# Patient Record
(demographics unavailable — no encounter records)

---

## 2024-10-14 NOTE — DISCUSSION/SUMMARY
[FreeTextEntry1] : 66M w HFrEF 25%, NICM, HLD, hx of GIB presents for f/u  1. NICM -HFrEF 25% -euvolemic, no complaints -taking meds with good compliance  -tte with LVEF <35  -f/u 4-5 months unless requires sooner 40 min spent on complete encounter.    [EKG obtained to assist in diagnosis and management of assessed problem(s)] : EKG obtained to assist in diagnosis and management of assessed problem(s)

## 2024-10-14 NOTE — HISTORY OF PRESENT ILLNESS
[FreeTextEntry1] : 66M w HFrEF 25%, NICM, HLD, hx of GIB presents for f/u. PMD: Dr Cardozo prev cardiologist Dr Ricci in [place], no longer taking his insurance.  Previously, pt last seen in cardiology for an initial eval 12/21. pt with known cardiomyopathy with LVEF 20%. prev nuclear stress without perfusion defects, pt has never had a cardiac cath. pt states a single episode 30 years ago where he had a pna and an arrhythmia and requires a "shock", states no further episodes since. pt maintained on bb, entresto, farxiga. pt to bring in records of prior cardiac imaging including cMRI. 5/2022, feeling well, states he takes entresto once daily and only half a pill of toprol. med compliance urged. pt last seen 9/22, feeling well. on entresto, toprol, farxiga pt has not been seen since 9/22 11/23, feeling well. plan for tte to eval LV EF, consider icd eval. pt also planning a colonoscopy. 1/24, feeling well. repeat tte showing LV EF 30-35% last seen 6/24, feeling ok. states poor med compliance if at all. planning sinus sx. pt not interested in taking any meds. sent to EP, recommended an ICD.  pt admitted to [facility] for ADHF 7/24. LV EF 12%. LHC with luminal irregularities, RHC showing CI 1.9. pt also sent to HF and cardio-genetics  pt now presents for follow up. today,  pt feeling well. no complaints. denies cp or sob, at rest or on exertion. denies LE edema, orthopnea. denies syncope. pt denies orthopnea.  now states improved med compliance  Exercise: every morning, push ups/squats. without symptoms Diet: none, eats very little meat. mostly fish.  Prior cardiac workup: per chart: TTE 3/2020: LVEF 20% Nuc stress 2020: normal perfusion Recent labs:  Med hx: HFrEF, colitis. Sx hx: none Fam hx: cardiomyopathy Social hx: lives in [place] with wife. 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 "[employer]". no tob. no etoh. +marijuana weekly. Meds: entresto  daily, toprol 25 farxiga 10, mesalamine aldactone 25 lipitor Allergies: nkda

## 2024-10-14 NOTE — HISTORY OF PRESENT ILLNESS
[FreeTextEntry1] : 66M w HFrEF 25%, NICM, HLD, hx of GIB presents for f/u. PMD: Dr Cardozo prev cardiologist Dr Ricci in [place], no longer taking his insurance.  Previously, pt last seen in cardiology for an initial eval 12/21. pt with known cardiomyopathy with LVEF 20%. prev nuclear stress without perfusion defects, pt has never had a cardiac cath. pt states a single episode 30 years ago where he had a pna and an arrhythmia and requires a "shock", states no further episodes since. pt maintained on bb, entresto, farxiga. pt to bring in records of prior cardiac imaging including cMRI. 5/2022, feeling well, states he takes entresto once daily and only half a pill of toprol. med compliance urged. pt last seen 9/22, feeling well. on entresto, toprol, farxiga pt has not been seen since 9/22 11/23, feeling well. plan for tte to eval LV EF, consider icd eval. pt also planning a colonoscopy. 1/24, feeling well. repeat tte showing LV EF 30-35% last seen 6/24, feeling ok. states poor med compliance if at all. planning sinus sx. pt not interested in taking any meds. sent to EP, recommended an ICD.  pt admitted to [facility] for ADHF 7/24. LV EF 12%. LHC with luminal irregularities, RHC showing CI 1.9. pt also sent to HF and cardio-genetics  pt now presents for follow up. today,  pt feeling well. no complaints. denies cp or sob, at rest or on exertion. denies LE edema, orthopnea. denies syncope. pt denies orthopnea.  now states improved med compliance  Exercise: every morning, push ups/squats. without symptoms Diet: none, eats very little meat. mostly fish.  Prior cardiac workup: per chart: TTE 3/2020: LVEF 20% Nuc stress 2020: normal perfusion Recent labs:  Med hx: HFrEF, colitis. Sx hx: none Fam hx: cardiomyopathy Social hx: lives in [place] with wife. 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 no tob. no etoh. +marijuana weekly. Meds: entresto  daily, toprol 25 farxiga 10, mesalamine aldactone 25 lipitor Allergies: nkda

## 2024-11-14 NOTE — HISTORY OF PRESENT ILLNESS
[de-identified] : 66M presenting with CRS and vc polyp for many years. He was recommended surgery. However, given his cardiac comorbidities, he is not a good candidate. He reports nasal congestion, facial pain/pressure, thick pus/mucus and cough worse in the AM.  CT in [facility] reviewed. Shows chronic pansinusitis with complete opacificatoin and inspissated secretions of the R max.

## 2024-11-14 NOTE — PLAN
[TextEntry] : We extensively reviewed his imaging and symptoms. Ultimately, he would benefit from a FESS but given his comorbidities, it is most appropriate to try and achieve symptomatic improvement with more limited in office procedure under local.   I will refer to Dr. Cabrales for his vocal cord polyp.   We will schedule for in office procedure when we have approval.

## 2024-11-14 NOTE — PROCEDURE
[FreeTextEntry6] : Nasal Endoscopy: Procedure: Bilateral nasal endoscopy (CPT 10684)   Indication: Anterior rhinoscopy was inadequate to evaluate pathology.  Left: Septum midline Moderate inferior turbinate hypertrophy  Middle meatus clear, without masses, polyps, or pus Sphenoethmoidal recess clear, without masses, polyps, or pus  Right:  medialized uncinate polypoid change in MM of ethmoid bulla rolled uncinate abutting MT and septum with edema  [de-identified] : Laryngoscopy: NPL: Nasopharynx clear without masses Base of tongue without masses or lesions Vallecula clear Pyriforms clear Epiglottis crisp TVFs mobile bilaterally, R TVF polyp Arytenoids normal Glottic airway patent

## 2024-12-03 NOTE — HISTORY OF PRESENT ILLNESS
[de-identified] : ALYSSA SEGURA is a 66 year old man who presents to the [facility] with difficulty phonating and vocal cord polyp. He is referred by Dr. Param Jacobo This problem has been going on for 30 years, but is now interested in surgery. They describe their voice as hoarse and raspy. Voice occasionally cuts out completely. No pain when speaking. He now does note periods of normal voicing. He does not note specific difficulties with swallowing. He does not note frequent classic heartburn symptoms. 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 Hx of chronic sinusitis and CHF with low ejection fraction, previously failed PST 4-5 months ago.  Has dried blood he spits up in the morning 1-2 times a week and occasionally wipes blood from his nose.  Denies dysphagia, odynophagia, dyspnea, fevers/chills, and unintentional weight loss.

## 2024-12-03 NOTE — CONSULT LETTER
[Dear  ___] : Dear  [unfilled], [Consult Letter:] : I had the pleasure of evaluating your patient, [unfilled]. [Please see my note below.] : Please see my note below. [Consult Closing:] : Thank you very much for allowing me to participate in the care of this patient.  If you have any questions, please do not hesitate to contact me. [Sincerely,] : Sincerely, [FreeTextEntry2] : Dr. Param Jacobo [FreeTextEntry3] : Teo Cabrales M.D. Division of Laryngology | Department of Otolaryngology  [address] [address]

## 2024-12-03 NOTE — PROCEDURE
[de-identified] : Stroboscopic Laryngoscopy Procedure Note: Indications: Assess laryngeal biomechanics and vocal fold oscillation. Description of Procedure: Informed consent was verbally obtained from the patient prior to the procedure. The patient was seated in the clinic chair. Topical anesthesia was achieved by first spraying the nasal cavities with 4% lidocaine and nasal decongestant. Findings: Supraglottis: no masses or lesions Glottis:  Vocal cords:                       Right: very large cyst involving majority of vocal fold                       Left:  crisp and shows no lesions or masses                Mobility:                       Right:  normal                       Left:  normal                Amplitude:                       Right:  absent                       Left:  normal                Closure: incomplete                Wave symmetry:  asymmetric Subglottis: no masses or lesions within the visualized subglottis. Visualized airway is widely patent. Other:

## 2024-12-03 NOTE — CONSULT LETTER
[Dear  ___] : Dear  [unfilled], [Consult Letter:] : I had the pleasure of evaluating your patient, [unfilled]. [Please see my note below.] : Please see my note below. [Consult Closing:] : Thank you very much for allowing me to participate in the care of this patient.  If you have any questions, please do not hesitate to contact me. [Sincerely,] : Sincerely, [FreeTextEntry2] : Dr. Param Jacobo [FreeTextEntry3] : Teo Cabrales M.D. Division of Laryngology | Department of Otolaryngology  [address] [address]

## 2024-12-03 NOTE — PROCEDURE
[de-identified] : Stroboscopic Laryngoscopy Procedure Note: Indications: Assess laryngeal biomechanics and vocal fold oscillation. Description of Procedure: Informed consent was verbally obtained from the patient prior to the procedure. The patient was seated in the clinic chair. Topical anesthesia was achieved by first spraying the nasal cavities with 4% lidocaine and nasal decongestant. Findings: Supraglottis: no masses or lesions Glottis:  Vocal cords:                       Right: very large cyst involving majority of vocal fold                       Left:  crisp and shows no lesions or masses                Mobility:                       Right:  normal                       Left:  normal                Amplitude:                       Right:  absent                       Left:  normal                Closure: incomplete                Wave symmetry:  asymmetric Subglottis: no masses or lesions within the visualized subglottis. Visualized airway is widely patent. Other:

## 2024-12-03 NOTE — ASSESSMENT
[FreeTextEntry1] :  Assessment/Plan: #1 CHF #2 Right vocal cord cyst #3 Dysphonia  After a thorough discussion of our findings I gave the patient the following options: 1) Observation 2) Voice therapy alone 3) Voice therapy plus surgery.  Surgery would involve direct laryngoscopy with excision of vocal fold lesion(s) with microflap, possible laryngeal biopsy, injection of steroid in right and/or left vocal fold(s), and examination of their trachea and mainstem bronchi.  The risks include but are not limited to damage to the teeth, vocal fold scarring, and a worse voice.  They would see a speech language pathologist prior to surgery and at least once after surgery at approximately the 1 month post-op narinder.  They would see me post operatively at 1 week and 1 month.  They would be expected to have strict voice rest 5 days after surgery and moderate voice use for the 2 weeks following that.  Had long discussion regarding in OR as gold standard but as patient has CHF that has previously prevented him from going to OR he would like to instead trial in office laser treatment first.  Understands that there is far from a gaurantee that this would permanetly remove this cyst and there would be a large chance of recurrence but as his cyst is so large it would hopefully be much smaller.  Plan would be to pop/drain all aspects of this cyst and had a small hole inferiorly for it to drain as it heals.  No need for voice rest. I would want to see him back in 2 weeks after in office procedure. The risks, benefits, and alternatives to care were discussed with the patient and understanding expressed.

## 2024-12-03 NOTE — HISTORY OF PRESENT ILLNESS
[de-identified] : ALYSSA SEGURA is a 66 year old man who presents to the [facility] with difficulty phonating and vocal cord polyp. He is referred by Dr. Param Jacobo This problem has been going on for 30 years, but is now interested in surgery. They describe their voice as hoarse and raspy. Voice occasionally cuts out completely. No pain when speaking. He now does note periods of normal voicing. He does not note specific difficulties with swallowing. He does not note frequent classic heartburn symptoms. 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 Hx of chronic sinusitis and CHF with low ejection fraction, previously failed PST 4-5 months ago.  Has dried blood he spits up in the morning 1-2 times a week and occasionally wipes blood from his nose.  Denies dysphagia, odynophagia, dyspnea, fevers/chills, and unintentional weight loss.

## 2024-12-18 NOTE — HISTORY OF PRESENT ILLNESS
[FreeTextEntry1] : 66 M w/ hx of NICM/HFrEF (EF 30-35% in 1/2024), HTN, HLD, GIB, ulcerative colitis (last flare 2023), chronic sinusitis, voice hoarseness 2/2 vocal cord polyp, L rotator cuff tears x2 presenting today for follow up.   He was admitted to [facility] from 7/12-7/18/24. He presented complaining of 2 weeks of worsening SOB/PACK associated with chest pain, orthopnea and PND found to be volume overloaded and noted to have new TWIs on ekg. Of note, he had been noncompliant with his cardiac medications since approximately October 2023 after reported improvement of his EF to 35% and fear of kidney disease given family history. TTE revealed severely reduced LVEF of 12% with reduced RV function and moderate MR. He was found to be overloaded, treated with intermittent IV Lasix. Underwent R/LHC 7/15 revealing mild irregularites and notably, hemodynamics with low Lindsay CI of 1.9 in the setting of elevated SVR of 2,275 dsc .He was initiated on some GDMT without diuretic requirement on discharge. Labs on discharge Cr 1.05/K 4.2, BNP 2614 (7/12).   He reports feeling generally well since discharge however has been complaining of intermittent lightheadedness at rest which typically occurs following his medications. He reports taking his AM medications all together. Reports slight improvement of symptom over the last few days. He has been closely monitoring his blood pressures at variable times throughout the day with values ranging 70s-90s/50s-60s. He confirms he is using upper arm cuff.  AT: He has been keeping active post-discharge, walking on the treadmill for approx 20-30 min without dyspnea/chest pain. Daily weights in range of 125-127lbs. His reported fluid intake- about 1L. Adhering to No salt diet.   He denies CP, SOB at rest, LE edema, orthopnea, PND, abdominal discomfort, palpitations, syncope. His appetite is normal, reports urinating slightly more often than prior to hospitalization. He denies recent diarrhea/vomiting or UC flare.    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 He has one daughter who lives in [place] and visits frequently. He has stopped smoking marijuana since hospitalization. He denies alcohol or other substance use. had previously smoked cigarettes on a social basis last time 2 years ago.

## 2024-12-18 NOTE — PHYSICAL EXAM
[No Acute Distress] : no acute distress [Normal Conjunctiva] : normal conjunctiva [Normal S1, S2] : normal S1, S2 [No Murmur] : no murmur [Clear Lung Fields] : clear lung fields [Good Air Entry] : good air entry [Soft] : abdomen soft [Normal Gait] : normal gait [No Edema] : no edema [No Rash] : no rash [Moves all extremities] : moves all extremities [Alert and Oriented] : alert and oriented [de-identified] : JVP <6cm [de-identified] : warm peripherally

## 2024-12-18 NOTE — DISCUSSION/SUMMARY
[FreeTextEntry1] : First seen in July 2024. had an NP visit for up titration scheduled but patient wasnt aware.  65yrs,  Long standing non ischemic CMP. Following with  Dr Juares. HFrEF LVEF 35% Oct 2023 FH: father with CMP.  HTN, UC on mesalamine.  vocal cord polyp.  prior daily marujuana use and meds non compliance.  previously  refused referral for ICD eval.  Admitted July 12-18: ADHF. CP SOB orthopena. TW inversion.  RHC RA 4, PA 38/19 27, PCWP 22, PA sat 65 Lindsay 3.2/1.9 Non obstructive cors. 125lbs TTE 7.16: LVEF 12, global. LVEDD 6.4, RV normal size with reduced function. mild mod MR. trace TR. trace effusion.  d/c weight 125lbs  seen by Dr Leung post d/c - patient wants to defer decision re ICD "my heart will get better". Has been referred for genetic testing did not follow up with Dr Simpson "was on vaccation- called to cancel but did not reschedule".  current  meds: mod entresto, toprol 25, farxega 5, atova 40, ald 25 mesalamine. no diuretics.  Feeling well. Can run up the stairs. No LE edema. BP at home 111s 131/72!!  67  140lbs (130 d/c 125) "this my natural weight" euvolemic no recent labs.  Will reschedule patient for uptitration.  Entresto to full dose in 2 steps. Then toprol 50. Q 3 weeks NP visits/telehealth  See me 4 mth after TTE Will renegotiate ICD after that.  Will reschedule Dr Calvin Jewell  45 mins with patient and chart

## 2024-12-18 NOTE — CARDIOLOGY SUMMARY
[de-identified] : 7/16/24 TTE: LVIDd 6.4 cm, LVEF 12% (global), normal RV size with reduced function (no TAPSE), mod LAE, MARTHA, trace AI, mild-mod MR, trace TR, trace pericardial effusion, IVC dilated 2.6 cm with normal inspiratory collapse   2021 TTE: LVIDd 5.7 cm, LVEF 16%   [de-identified] : 2021 cMRI: LVEF 28%, no LGE  [de-identified] : 7/15/24 L/RHC nonobstructive CAD, RA 4, PA 38/19/27, PCWP 22, PA 64.7%, CO/CI (F) 3.2/1.9, /83 (95), HR 96, SVR 2,275 dsc

## 2024-12-18 NOTE — CARDIOLOGY SUMMARY
[de-identified] : 7/16/24 TTE: LVIDd 6.4 cm, LVEF 12% (global), normal RV size with reduced function (no TAPSE), mod LAE, MARTHA, trace AI, mild-mod MR, trace TR, trace pericardial effusion, IVC dilated 2.6 cm with normal inspiratory collapse   2021 TTE: LVIDd 5.7 cm, LVEF 16%   [de-identified] : 2021 cMRI: LVEF 28%, no LGE  [de-identified] : 7/15/24 L/RHC nonobstructive CAD, RA 4, PA 38/19/27, PCWP 22, PA 64.7%, CO/CI (F) 3.2/1.9, /83 (95), HR 96, SVR 2,275 dsc

## 2024-12-18 NOTE — PHYSICAL EXAM
[No Acute Distress] : no acute distress [Normal Conjunctiva] : normal conjunctiva [Normal S1, S2] : normal S1, S2 [No Murmur] : no murmur [Clear Lung Fields] : clear lung fields [Good Air Entry] : good air entry [Soft] : abdomen soft [Normal Gait] : normal gait [No Edema] : no edema [No Rash] : no rash [Moves all extremities] : moves all extremities [Alert and Oriented] : alert and oriented [de-identified] : JVP <6cm [de-identified] : warm peripherally

## 2024-12-18 NOTE — HISTORY OF PRESENT ILLNESS
[FreeTextEntry1] : 66 M w/ hx of NICM/HFrEF (EF 30-35% in 1/2024), HTN, HLD, GIB, ulcerative colitis (last flare 2023), chronic sinusitis, voice hoarseness 2/2 vocal cord polyp, L rotator cuff tears x2 presenting today for follow up.   He was admitted to [facility] from 7/12-7/18/24. He presented complaining of 2 weeks of worsening SOB/PACK associated with chest pain, orthopnea and PND found to be volume overloaded and noted to have new TWIs on ekg. Of note, he had been noncompliant with his cardiac medications since approximately October 2023 after reported improvement of his EF to 35% and fear of kidney disease given family history. TTE revealed severely reduced LVEF of 12% with reduced RV function and moderate MR. He was found to be overloaded, treated with intermittent IV Lasix. Underwent R/LHC 7/15 revealing mild irregularites and notably, hemodynamics with low Lindsay CI of 1.9 in the setting of elevated SVR of 2,275 dsc .He was initiated on some GDMT without diuretic requirement on discharge. Labs on discharge Cr 1.05/K 4.2, BNP 2614 (7/12).   He reports feeling generally well since discharge however has been complaining of intermittent lightheadedness at rest which typically occurs following his medications. He reports taking his AM medications all together. Reports slight improvement of symptom over the last few days. He has been closely monitoring his blood pressures at variable times throughout the day with values ranging 70s-90s/50s-60s. He confirms he is using upper arm cuff.  AT: He has been keeping active post-discharge, walking on the treadmill for approx 20-30 min without dyspnea/chest pain. Daily weights in range of 125-127lbs. His reported fluid intake- about 1L. Adhering to No salt diet.   He denies CP, SOB at rest, LE edema, orthopnea, PND, abdominal discomfort, palpitations, syncope. His appetite is normal, reports urinating slightly more often than prior to hospitalization. He denies recent diarrhea/vomiting or UC flare.    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 He has stopped smoking marijuana since hospitalization. He denies alcohol or other substance use. had previously smoked cigarettes on a social basis last time 2 years ago.

## 2024-12-19 NOTE — HISTORY OF PRESENT ILLNESS
[de-identified] : Update 12/19/24: fu CRS s/p in office R limited FESS with frontal balloon for fungal ball 12/12/24.  States he has not completed course of antibiotics. He is doing well overall, breathing and congestion has improved. Denies epistaxis, fevers, chills, sinus pressure/pain, and recent infections. Pending apt with Dr. Cabrales 12/26 for dysphonia.   Update 12/12/24: fu CRS. plan for limited in office FESS today  66M presenting with CRS and vc polyp for many years. He was recommended surgery. However, given his cardiac comorbidities, he is not a good candidate. He reports nasal congestion, facial pain/pressure, thick pus/mucus and cough worse in the AM.  CT in [facility] reviewed. Shows chronic pansinusitis with complete opacificatoin and inspissated secretions of the R max.

## 2024-12-19 NOTE — ASSESSMENT
[FreeTextEntry1] : 66M presenting with CRS and R VC polyp s/p in office R limited FESS and frontal balloon 12/12/24.

## 2024-12-19 NOTE — PROCEDURE
[FreeTextEntry3] : Informed consent was obtained. The patient was registered to stereotactic CT image guidance via surface match registratoin. The right nasal cavity was decongested with topical afrin. It was then anesthetized with 4% topical lidocaine. Additionally, 2cc of 6% tetracaine jelly topically was used. After adequate time, the microdebrider and backbiter were used to remove the uncinate. Copious fungus was noted in the maxillary sinus with an expanded medial wall. This was removed with angled instrumentation and the angled endoscope. That natural os was widened circumferentially using through cutting instruments. Then attention was turned towards the anterior ethmoid cavity. The anterior ethmoid cells had polypoid change noted. The cells were removed with the microdebrider. Then attention was turned towards the right frontal sinus. The navigated frontal sinus balloon was then advanced into the frontal sinus and inflated for 3 seconds. The inferior turbinate was outfractured with a freer elevator. Hemostasis was achieved with topical afrin soaked cotton. Posisep was placed in the middle meatus. Pt tolerated the procedure well.  [Post-Op Patency] : post-op patency [Anterior rhinoscopy insufficient to account for symptoms] : anterior rhinoscopy insufficient to account for symptoms [de-identified] : alexandra [FreeTextEntry6] : Nasal Endoscopy: Procedure: Bilateral nasal endoscopy (CPT 35322)   Indication: Anterior rhinoscopy was inadequate to evaluate pathology.  Left: Septum midline Moderate inferior turbinate hypertrophy  Middle meatus clear, without masses, polyps, or pus Sphenoethmoidal recess clear, without masses, polyps, or pus  Right:  medialized uncinate polypoid change in MM of ethmoid bulla rolled uncinate abutting MT and septum with edema

## 2024-12-19 NOTE — PROCEDURE
[FreeTextEntry3] : Informed consent was obtained. The patient was registered to stereotactic CT image guidance via surface match registratoin. The right nasal cavity was decongested with topical afrin. It was then anesthetized with 4% topical lidocaine. Additionally, 2cc of 6% tetracaine jelly topically was used. After adequate time, the microdebrider and backbiter were used to remove the uncinate. Copious fungus was noted in the maxillary sinus with an expanded medial wall. This was removed with angled instrumentation and the angled endoscope. That natural os was widened circumferentially using through cutting instruments. Then attention was turned towards the anterior ethmoid cavity. The anterior ethmoid cells had polypoid change noted. The cells were removed with the microdebrider. Then attention was turned towards the right frontal sinus. The navigated frontal sinus balloon was then advanced into the frontal sinus and inflated for 3 seconds. The inferior turbinate was outfractured with a freer elevator. Hemostasis was achieved with topical afrin soaked cotton. Posisep was placed in the middle meatus. Pt tolerated the procedure well.  [Post-Op Patency] : post-op patency [Anterior rhinoscopy insufficient to account for symptoms] : anterior rhinoscopy insufficient to account for symptoms [de-identified] : alexandra [FreeTextEntry6] : Nasal Endoscopy: Procedure: Bilateral nasal endoscopy (CPT 06476)   Indication: Anterior rhinoscopy was inadequate to evaluate pathology.  Left: Septum midline Moderate inferior turbinate hypertrophy  Middle meatus clear, without masses, polyps, or pus Sphenoethmoidal recess clear, without masses, polyps, or pus  Right:  medialized uncinate polypoid change in MM of ethmoid bulla rolled uncinate abutting MT and septum with edema

## 2024-12-19 NOTE — HISTORY OF PRESENT ILLNESS
[de-identified] : Update 12/19/24: fu CRS s/p in office R limited FESS with frontal balloon for fungal ball 12/12/24.  States he has not completed course of antibiotics. He is doing well overall, breathing and congestion has improved. Denies epistaxis, fevers, chills, sinus pressure/pain, and recent infections. Pending apt with Dr. Cabrales 12/26 for dysphonia.   Update 12/12/24: fu CRS. plan for limited in office FESS today  66M presenting with CRS and vc polyp for many years. He was recommended surgery. However, given his cardiac comorbidities, he is not a good candidate. He reports nasal congestion, facial pain/pressure, thick pus/mucus and cough worse in the AM.  CT in [facility] reviewed. Shows chronic pansinusitis with complete opacificatoin and inspissated secretions of the R max.

## 2024-12-26 NOTE — HISTORY OF PRESENT ILLNESS
[de-identified] :  ALYSSA SEGURA is a 66 year old man who presents to the [facility] with CHF, right vocal cord cyst, and dysphonia. Last seen 12/3/24. As pt has previously had problems going to the OR due to CHF, we were to trial in-office laser to pop and drain the cyst.   Previously reported: difficulty phonating and vocal cord polyp. He is referred by Dr. Param Jacobo This problem has been going on for 30 years, but is now interested in surgery. They describe their voice as hoarse and raspy. Voice occasionally cuts out completely. No pain when speaking. He now does note periods of normal voicing. He does not note specific difficulties with swallowing. He does not note frequent classic heartburn symptoms. 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 Has dried blood he spits up in the morning 1-2 times a week and occasionally wipes blood from his nose. Denies dysphagia, odynophagia, dyspnea, fevers/chills, and unintentional weight loss.  Prior Pertinent Procedures: 12/3/24: ****

## 2024-12-26 NOTE — PROCEDURE
[FreeTextEntry3] : Flexible laryngoscopy with TruBlue Laser Destruction of right vocal fold cyst SURGEON: Teo Cabrales MD  Laser Settings: 30/15/2  FINDINGS: Large right vocal fold cyst  NARRATIVE: Mr. SEGURA was brought to clinic and consented for the procedure. The risks, benefits, and alternatives of the procedure were thoroughly discussed with the patient. The patient appeared to understand and agreed to proceed.  His nose was topicalized with oxymetazoline nasal spray and lidocaine.  After topicalization, a surgical pause was held to confirm correct patient, procedure, site, allergies, equipment and position. topicalization, a surgical pause was held to confirm correct patient, procedure, site, allergies, equipment and position. A laser pause was also taken to be sure we had appropriate laser safety precautions in place. The patient and all staff wore TruBlue shielded eyewear. Laser in use signs were posted on the door outside the room.  Once adequately anesthetized, a flexible channel videolaryngoscope was introduced into the left nare. The scope was positioned above the epiglottis and the extent of disease was noted as described in the FINDINGS section. Approximately 6 cc of topical 4% lidocaine solution was passed through the working channel of the scope and used for laryngeal gargle to anesthetize the glottis and supraglottis.   Next, the laser fiber was passed to the tip of the scope. This fiber was previously tested and connected to a TruBlue laser. The laser was put at the settings listed above. Treatment was achieved by ablation.At the conclusion of the procedure, there was trace bleeding, but this stopped spontaneously. Mr. SEGURA tolerated the procedure extremely well. Of note, I was present and performed the entirety of the procedure.   DISPOSITION: Mr. SEGURA was discharged home with instructions not to eat or drink for about 60 minutes.

## 2024-12-26 NOTE — ASSESSMENT
[FreeTextEntry1] : Assessment/Plan: #1 CHF #2 Right vocal cord cyst s/p in office laser #3 Dysphonia  Patient to return in 1 month. Will use arnuity 1 puff daily until then.

## 2024-12-26 NOTE — HISTORY OF PRESENT ILLNESS
[de-identified] :  ALYSSA SEGURA is a 66 year old man who presents to the [facility] with CHF, right vocal cord cyst, and dysphonia. Last seen 12/3/24. As pt has previously had problems going to the OR due to CHF, we were to trial in-office laser to pop and drain the cyst.   Previously reported: difficulty phonating and vocal cord polyp. He is referred by Dr. Param Jacobo This problem has been going on for 30 years, but is now interested in surgery. They describe their voice as hoarse and raspy. Voice occasionally cuts out completely. No pain when speaking. He now does note periods of normal voicing. He does not note specific difficulties with swallowing. He does not note frequent classic heartburn symptoms. 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 Has dried blood he spits up in the morning 1-2 times a week and occasionally wipes blood from his nose. Denies dysphagia, odynophagia, dyspnea, fevers/chills, and unintentional weight loss.  Prior Pertinent Procedures: 12/3/24: ****

## 2025-01-28 NOTE — HISTORY OF PRESENT ILLNESS
[de-identified] : ALYSSA SEGURA is a 66 year old man who presents to the [facility] with CHF, right vocal cord cyst s/p in-office laser, and dysphonia. Last seen 12/26/24. Was to use arnuity 1 puff daily and follow up in 1mth. Has used arnuity inhaler for one week but then stopped on his own.  States voice has greatly improved. No longer fading or hoarse.  Denies dysphagia, odynophagia, throat pain, pain while speaking, globus sensation, neck swelling, dyspnea, fevers/chills, and unintentional weight loss. CRS s/p in office R limited FESS with frontal balloon for fungal ball 12/12/24 with Dr. Jacobo.   Previously reported: difficulty phonating and vocal cord polyp. He is referred by Dr. Param Jacobo This problem has been going on for 30 years, but is now interested in surgery. They describe their voice as hoarse and raspy. Voice occasionally cuts out completely. No pain when speaking. He now does note periods of normal voicing. He does not note specific difficulties with swallowing. He does not note frequent classic heartburn symptoms. 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 Has dried blood he spits up in the morning 1-2 times a week and occasionally wipes blood from his nose. Denies dysphagia, odynophagia, dyspnea, fevers/chills, and unintentional weight loss.  Prior Pertinent Procedures: 12/26/24: In-office laser destruction of vocal cord cyst; Dr. Cabrales

## 2025-01-28 NOTE — ASSESSMENT
[FreeTextEntry1] : Assessment/Plan: #1 CHF #2 Right vocal cord cyst s/p in office laser #3 Dysphonia- mild  He is to restart arnuity 1 puff bid until runs out in 2-3 weeks.  Will follow up with me in 2 months.

## 2025-01-28 NOTE — PROCEDURE
[de-identified] : Stroboscopic Laryngoscopy Procedure Note:  Indication:	Assess laryngeal biomechanics and vocal fold oscillation.  Description of Procedure:	Informed consent was verbally obtained from the patient prior to the procedure. The patient was seated in the clinic chair. Topical anesthesia was achieved by first spraying the nasal cavities with 4% lidocaine and nasal decongestant.   Findings:  Supraglottis: no masses or lesions  Glottis:    Structure:                        Right: small posterior volume defect with minimal granulation tissue                       Left:  crisp and shows no lesions or masses                 Mobility:                        Right:  normal                        Left:  normal                Amplitude:                        Right:  severely decreased                       Left:  normal                Closure: complete                 Wave symmetry:  asymmetric  Subglottis: no masses or lesions within the visualized subglottis Visualized airway is widely patent.

## 2025-01-28 NOTE — HISTORY OF PRESENT ILLNESS
[de-identified] : ALYSSA SEGURA is a 66 year old man who presents to the [facility] with CHF, right vocal cord cyst s/p in-office laser, and dysphonia. Last seen 12/26/24. Was to use arnuity 1 puff daily and follow up in 1mth. Has used arnuity inhaler for one week but then stopped on his own.  States voice has greatly improved. No longer fading or hoarse.  Denies dysphagia, odynophagia, throat pain, pain while speaking, globus sensation, neck swelling, dyspnea, fevers/chills, and unintentional weight loss. CRS s/p in office R limited FESS with frontal balloon for fungal ball 12/12/24 with Dr. Jacobo.   Previously reported: difficulty phonating and vocal cord polyp. He is referred by Dr. Param Jacobo This problem has been going on for 30 years, but is now interested in surgery. They describe their voice as hoarse and raspy. Voice occasionally cuts out completely. No pain when speaking. He now does note periods of normal voicing. He does not note specific difficulties with swallowing. He does not note frequent classic heartburn symptoms. 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 Has dried blood he spits up in the morning 1-2 times a week and occasionally wipes blood from his nose. Denies dysphagia, odynophagia, dyspnea, fevers/chills, and unintentional weight loss.  Prior Pertinent Procedures: 12/26/24: In-office laser destruction of vocal cord cyst; Dr. Cabrales

## 2025-01-28 NOTE — PROCEDURE
[de-identified] : Stroboscopic Laryngoscopy Procedure Note:  Indication:	Assess laryngeal biomechanics and vocal fold oscillation.  Description of Procedure:	Informed consent was verbally obtained from the patient prior to the procedure. The patient was seated in the clinic chair. Topical anesthesia was achieved by first spraying the nasal cavities with 4% lidocaine and nasal decongestant.   Findings:  Supraglottis: no masses or lesions  Glottis:    Structure:                        Right: small posterior volume defect with minimal granulation tissue                       Left:  crisp and shows no lesions or masses                 Mobility:                        Right:  normal                        Left:  normal                Amplitude:                        Right:  severely decreased                       Left:  normal                Closure: complete                 Wave symmetry:  asymmetric  Subglottis: no masses or lesions within the visualized subglottis Visualized airway is widely patent.

## 2025-04-21 NOTE — PHYSICAL EXAM
[No Acute Distress] : no acute distress [Normal Conjunctiva] : normal conjunctiva [Normal S1, S2] : normal S1, S2 [No Murmur] : no murmur [Clear Lung Fields] : clear lung fields [Good Air Entry] : good air entry [Soft] : abdomen soft [Normal Gait] : normal gait [No Edema] : no edema [No Rash] : no rash [Moves all extremities] : moves all extremities [Alert and Oriented] : alert and oriented [de-identified] : JVP <6cm [de-identified] : warm peripherally

## 2025-04-21 NOTE — CARDIOLOGY SUMMARY
[de-identified] : 7/16/24 TTE: LVIDd 6.4 cm, LVEF 12% (global), normal RV size with reduced function (no TAPSE), mod LAE, MARTHA, trace AI, mild-mod MR, trace TR, trace pericardial effusion, IVC dilated 2.6 cm with normal inspiratory collapse   2021 TTE: LVIDd 5.7 cm, LVEF 16%   [de-identified] : 2021 cMRI: LVEF 28%, no LGE  [de-identified] : 7/15/24 L/RHC nonobstructive CAD, RA 4, PA 38/19/27, PCWP 22, PA 64.7%, CO/CI (F) 3.2/1.9, /83 (95), HR 96, SVR 2,275 dsc

## 2025-04-21 NOTE — PHYSICAL EXAM
[No Acute Distress] : no acute distress [Normal Conjunctiva] : normal conjunctiva [Normal S1, S2] : normal S1, S2 [No Murmur] : no murmur [Clear Lung Fields] : clear lung fields [Good Air Entry] : good air entry [Soft] : abdomen soft [Normal Gait] : normal gait [No Edema] : no edema [No Rash] : no rash [Moves all extremities] : moves all extremities [Alert and Oriented] : alert and oriented [de-identified] : JVP <6cm [de-identified] : warm peripherally

## 2025-04-21 NOTE — CARDIOLOGY SUMMARY
[de-identified] : 7/16/24 TTE: LVIDd 6.4 cm, LVEF 12% (global), normal RV size with reduced function (no TAPSE), mod LAE, MARTHA, trace AI, mild-mod MR, trace TR, trace pericardial effusion, IVC dilated 2.6 cm with normal inspiratory collapse   2021 TTE: LVIDd 5.7 cm, LVEF 16%   [de-identified] : 2021 cMRI: LVEF 28%, no LGE  [de-identified] : 7/15/24 L/RHC nonobstructive CAD, RA 4, PA 38/19/27, PCWP 22, PA 64.7%, CO/CI (F) 3.2/1.9, /83 (95), HR 96, SVR 2,275 dsc

## 2025-04-21 NOTE — HISTORY OF PRESENT ILLNESS
[FreeTextEntry1] : 66 M w/ hx of NICM/HFrEF (EF 30-35% in 1/2024), HTN, HLD, GIB, ulcerative colitis (last flare 2023), chronic sinusitis, voice hoarseness 2/2 vocal cord polyp, L rotator cuff tears x2 presenting today for follow up.   He was admitted to [facility] from 7/12-7/18/24. He presented complaining of 2 weeks of worsening SOB/PACK associated with chest pain, orthopnea and PND found to be volume overloaded and noted to have new TWIs on ekg. Of note, he had been noncompliant with his cardiac medications since approximately October 2023 after reported improvement of his EF to 35% and fear of kidney disease given family history. TTE revealed severely reduced LVEF of 12% with reduced RV function and moderate MR. He was found to be overloaded, treated with intermittent IV Lasix. Underwent R/LHC 7/15 revealing mild irregularites and notably, hemodynamics with low Lindsay CI of 1.9 in the setting of elevated SVR of 2,275 dsc .He was initiated on some GDMT without diuretic requirement on discharge. Labs on discharge Cr 1.05/K 4.2, BNP 2614 (7/12).   He reports feeling generally well since discharge however has been complaining of intermittent lightheadedness at rest which typically occurs following his medications. He reports taking his AM medications all together. Reports slight improvement of symptom over the last few days. He has been closely monitoring his blood pressures at variable times throughout the day with values ranging 70s-90s/50s-60s. He confirms he is using upper arm cuff.  AT: He has been keeping active post-discharge, walking on the treadmill for approx 20-30 min without dyspnea/chest pain. Daily weights in range of 125-127lbs. His reported fluid intake- about 1L. Adhering to No salt diet.   He denies CP, SOB at rest, LE edema, orthopnea, PND, abdominal discomfort, palpitations, syncope. His appetite is normal, reports urinating slightly more often than prior to hospitalization. He denies recent diarrhea/vomiting or UC flare.    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 He has one daughter who lives in [place] and visits frequently. He has stopped smoking marijuana since hospitalization. He denies alcohol or other substance use. had previously smoked cigarettes on a social basis last time 2 years ago.    4/21/25 feels well, unlimited AT, 's at home, taking all meds, HR 60's. Taking all meds PE looks great Plan: discussed again at length device- Not interested. Wants to enroll cardiac rehab- provided list will call with center of choice no med changes- Toprol limited by HR TTE will Positve remodeling and EF better he will arrange f/u with Genetics re-jessica richards at next avail switch visit to NP today

## 2025-04-21 NOTE — HISTORY OF PRESENT ILLNESS
[FreeTextEntry1] : 66 M w/ hx of NICM/HFrEF (EF 30-35% in 1/2024), HTN, HLD, GIB, ulcerative colitis (last flare 2023), chronic sinusitis, voice hoarseness 2/2 vocal cord polyp, L rotator cuff tears x2 presenting today for follow up.   He was admitted to [facility] from 7/12-7/18/24. He presented complaining of 2 weeks of worsening SOB/PACK associated with chest pain, orthopnea and PND found to be volume overloaded and noted to have new TWIs on ekg. Of note, he had been noncompliant with his cardiac medications since approximately October 2023 after reported improvement of his EF to 35% and fear of kidney disease given family history. TTE revealed severely reduced LVEF of 12% with reduced RV function and moderate MR. He was found to be overloaded, treated with intermittent IV Lasix. Underwent R/LHC 7/15 revealing mild irregularites and notably, hemodynamics with low Lindsay CI of 1.9 in the setting of elevated SVR of 2,275 dsc .He was initiated on some GDMT without diuretic requirement on discharge. Labs on discharge Cr 1.05/K 4.2, BNP 2614 (7/12).   He reports feeling generally well since discharge however has been complaining of intermittent lightheadedness at rest which typically occurs following his medications. He reports taking his AM medications all together. Reports slight improvement of symptom over the last few days. He has been closely monitoring his blood pressures at variable times throughout the day with values ranging 70s-90s/50s-60s. He confirms he is using upper arm cuff.  AT: He has been keeping active post-discharge, walking on the treadmill for approx 20-30 min without dyspnea/chest pain. Daily weights in range of 125-127lbs. His reported fluid intake- about 1L. Adhering to No salt diet.   He denies CP, SOB at rest, LE edema, orthopnea, PND, abdominal discomfort, palpitations, syncope. His appetite is normal, reports urinating slightly more often than prior to hospitalization. He denies recent diarrhea/vomiting or UC flare.    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 He has one daughter who lives in [place] and visits frequently. He has stopped smoking marijuana since hospitalization. He denies alcohol or other substance use. had previously smoked cigarettes on a social basis last time 2 years ago.    4/21/25 feels well, unlimited AT, 's at home, taking all meds, HR 60's. Taking all meds PE looks great Plan: discussed again at length device- Not interested. Wants to enroll cardiac rehab- provided list will call with center of choice no med changes- Toprol limited by HR TTE will Positve remodeling and EF better he will arrange f/u with Genetics re-jessica richards at next avail switch visit to NP today

## 2025-05-20 NOTE — ASSESSMENT
[FreeTextEntry1] : Assessment/Plan: #1 CHF #2 Right vocal cord cyst s/p in office laser #3 Dysphonia- mild  May follow up PRN. Should follow up with Dr. Jacobo for sinus issues. No

## 2025-05-20 NOTE — PROCEDURE
[de-identified] : Stroboscopic Laryngoscopy Procedure Note: Indications: Assess laryngeal biomechanics and vocal fold oscillation. Description of Procedure: Informed consent was verbally obtained from the patient prior to the procedure. The patient was seated in the clinic chair. Topical anesthesia was achieved by first spraying the nasal cavities with 4% lidocaine and nasal decongestant. Findings: Supraglottis: no masses or lesions Glottis:  Vocal cords:                       Right: mild irregular vibratory edge                       Left:  crisp and shows no lesions or masses                Mobility:                       Right:  normal                       Left:  normal                Amplitude:                       Right:  decreased                       Left:  normal                Closure: complete                Wave symmetry:  mildly asymmetric Subglottis: no masses or lesions within the visualized subglottis. Visualized airway is widely patent.

## 2025-05-20 NOTE — HISTORY OF PRESENT ILLNESS
[de-identified] : ALYSSA SEGURA is a 66 year old man who presents to the [facility] with CHF, right vocal cord cyst s/p in-office laser, and dysphonia. Last seen 1/28/25. Was to use arnuity 1 puff BID until it ran out and follow up in 2mths. States he used arnuity inhaler one month after the last visit in jan then stopped.  states voice quality has improved but continues to have periods "nasal speech" and hoarseness.  Voice is no longer cracking.  Patient denies dysphagia, odynophagia, dyspnea, throat pain, globus sensation, nasal congestion, pain while speaking, sob when speaking, hemoptysis, cough, neck swelling or throat infections    Previously reported: difficulty phonating and vocal cord polyp. He is referred by Dr. Param Jacobo This problem has been going on for 30 years, but is now interested in surgery. They describe their voice as hoarse and raspy. Voice occasionally cuts out completely. No pain when speaking. He now does note periods of normal voicing. He does not note specific difficulties with swallowing. He does not note frequent classic heartburn symptoms. 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 Has dried blood he spits up in the morning 1-2 times a week and occasionally wipes blood from his nose. Denies dysphagia, odynophagia, dyspnea, fevers/chills, and unintentional weight loss.  Prior Pertinent Procedures: 12/26/24: In-office laser destruction of vocal cord cyst; Dr. Cabrales

## 2025-05-20 NOTE — ASSESSMENT
[FreeTextEntry1] : Assessment/Plan: #1 CHF #2 Right vocal cord cyst s/p in office laser #3 Dysphonia- mild  May follow up PRN. Should follow up with Dr. Jacobo for sinus issues.

## 2025-05-20 NOTE — HISTORY OF PRESENT ILLNESS
[de-identified] : ALYSSA SEGURA is a 66 year old man who presents to the [facility] with CHF, right vocal cord cyst s/p in-office laser, and dysphonia. Last seen 1/28/25. Was to use arnuity 1 puff BID until it ran out and follow up in 2mths. States he used arnuity inhaler one month after the last visit in jan then stopped.  states voice quality has improved but continues to have periods "nasal speech" and hoarseness.  Voice is no longer cracking.  Patient denies dysphagia, odynophagia, dyspnea, throat pain, globus sensation, nasal congestion, pain while speaking, sob when speaking, hemoptysis, cough, neck swelling or throat infections    Previously reported: difficulty phonating and vocal cord polyp. He is referred by Dr. Param Jacobo This problem has been going on for 30 years, but is now interested in surgery. They describe their voice as hoarse and raspy. Voice occasionally cuts out completely. No pain when speaking. He now does note periods of normal voicing. He does not note specific difficulties with swallowing. He does not note frequent classic heartburn symptoms. 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 Has dried blood he spits up in the morning 1-2 times a week and occasionally wipes blood from his nose. Denies dysphagia, odynophagia, dyspnea, fevers/chills, and unintentional weight loss.  Prior Pertinent Procedures: 12/26/24: In-office laser destruction of vocal cord cyst; Dr. Cabrales

## 2025-05-20 NOTE — PROCEDURE
[de-identified] : Stroboscopic Laryngoscopy Procedure Note: Indications: Assess laryngeal biomechanics and vocal fold oscillation. Description of Procedure: Informed consent was verbally obtained from the patient prior to the procedure. The patient was seated in the clinic chair. Topical anesthesia was achieved by first spraying the nasal cavities with 4% lidocaine and nasal decongestant. Findings: Supraglottis: no masses or lesions Glottis:  Vocal cords:                       Right: mild irregular vibratory edge                       Left:  crisp and shows no lesions or masses                Mobility:                       Right:  normal                       Left:  normal                Amplitude:                       Right:  decreased                       Left:  normal                Closure: complete                Wave symmetry:  mildly asymmetric Subglottis: no masses or lesions within the visualized subglottis. Visualized airway is widely patent.

## 2025-05-29 NOTE — HISTORY OF PRESENT ILLNESS
[de-identified] : Update 5/29/25: fu CRS s/p in office R limited FESS with frontal balloon for fungal ball 12/12/24. Currently using saline irrigations BID.  Update 12/19/24: fu CRS s/p in office R limited FESS with frontal balloon for fungal ball 12/12/24.  States he has not completed course of antibiotics. He is doing well overall, breathing and congestion has improved. Denies epistaxis, fevers, chills, sinus pressure/pain, and recent infections. Pending apt with Dr. Cabrales 12/26 for dysphonia.   Update 12/12/24: fu CRS. plan for limited in office FESS today  66M presenting with CRS and vc polyp for many years. He was recommended surgery. However, given his cardiac comorbidities, he is not a good candidate. He reports nasal congestion, facial pain/pressure, thick pus/mucus and cough worse in the AM.  CT in [facility] reviewed. Shows chronic pansinusitis with complete opacificatoin and inspissated secretions of the R max.

## 2025-05-29 NOTE — PROCEDURE
[Post-Op Patency] : post-op patency [Anterior rhinoscopy insufficient to account for symptoms] : anterior rhinoscopy insufficient to account for symptoms [de-identified] : alexandra [FreeTextEntry6] : Nasal Endoscopy: Procedure: Bilateral nasal endoscopy (CPT 87316)   Indication: Anterior rhinoscopy was inadequate to evaluate pathology.  Left: Septum midline Moderate inferior turbinate hypertrophy  Middle meatus clear, without masses, polyps, or pus Sphenoethmoidal recess clear, without masses, polyps, or pus  Right:  medialized uncinate polypoid change in MM of ethmoid bulla rolled uncinate abutting MT and septum with edema